# Patient Record
Sex: FEMALE | Race: BLACK OR AFRICAN AMERICAN | Employment: UNEMPLOYED | URBAN - METROPOLITAN AREA
[De-identification: names, ages, dates, MRNs, and addresses within clinical notes are randomized per-mention and may not be internally consistent; named-entity substitution may affect disease eponyms.]

---

## 2021-04-22 ENCOUNTER — HOSPITAL ENCOUNTER (EMERGENCY)
Age: 26
Discharge: HOME | End: 2021-04-22
Payer: MEDICAID

## 2021-04-22 VITALS
RESPIRATION RATE: 18 BRPM | OXYGEN SATURATION: 100 % | HEART RATE: 80 BPM | DIASTOLIC BLOOD PRESSURE: 56 MMHG | WEIGHT: 288.14 LBS | SYSTOLIC BLOOD PRESSURE: 104 MMHG | BODY MASS INDEX: 47.95 KG/M2 | TEMPERATURE: 99 F

## 2021-04-22 DIAGNOSIS — R10.9 ABDOMINAL CRAMPING: Primary | ICD-10-CM

## 2021-04-22 LAB
BACTERIA: ABNORMAL /HPF
BILIRUBIN URINE: NEGATIVE
BLOOD, URINE: NEGATIVE
CLARITY: ABNORMAL
COLOR: YELLOW
EPITHELIAL CELLS, UA: 8 /HPF (ref 0–5)
GLUCOSE URINE: NEGATIVE MG/DL
HCG(URINE) PREGNANCY TEST: POSITIVE
HYALINE CASTS: 1 /LPF (ref 0–8)
KETONES, URINE: ABNORMAL MG/DL
LEUKOCYTE ESTERASE, URINE: NEGATIVE
MICROSCOPIC EXAMINATION: YES
NITRITE, URINE: NEGATIVE
PH UA: 7.5 (ref 5–8)
PROTEIN UA: NEGATIVE MG/DL
RBC UA: 1 /HPF (ref 0–4)
SPECIFIC GRAVITY UA: 1.01 (ref 1–1.03)
URINE REFLEX TO CULTURE: ABNORMAL
URINE TYPE: ABNORMAL
UROBILINOGEN, URINE: 0.2 E.U./DL
WBC UA: 4 /HPF (ref 0–5)

## 2021-04-22 PROCEDURE — 84703 CHORIONIC GONADOTROPIN ASSAY: CPT

## 2021-04-22 PROCEDURE — 99284 EMERGENCY DEPT VISIT MOD MDM: CPT

## 2021-04-22 PROCEDURE — 81001 URINALYSIS AUTO W/SCOPE: CPT

## 2021-04-22 ASSESSMENT — ENCOUNTER SYMPTOMS
BACK PAIN: 0
EYE DISCHARGE: 0
APNEA: 0
FACIAL SWELLING: 0
ABDOMINAL PAIN: 1
SHORTNESS OF BREATH: 0
CHOKING: 0
EYE REDNESS: 0
SORE THROAT: 0
VOMITING: 0
NAUSEA: 0

## 2021-04-22 ASSESSMENT — PAIN DESCRIPTION - FREQUENCY: FREQUENCY: CONTINUOUS

## 2021-04-22 ASSESSMENT — PAIN DESCRIPTION - ORIENTATION
ORIENTATION: LOWER
ORIENTATION: LOWER

## 2021-04-22 ASSESSMENT — PAIN SCALES - GENERAL: PAINLEVEL_OUTOF10: 2

## 2021-04-22 ASSESSMENT — PAIN DESCRIPTION - LOCATION: LOCATION: ABDOMEN

## 2021-04-22 NOTE — ED TRIAGE NOTES
Pt arrived to dept via private vehicle. Pt c/o lower abd cramping x 3 days with no bleeding or vaginal discharge. Pt reports being 15 weeks pregnant. Pt awake, alert and oriented x 3. Skin warm and dry/normal color for ethnicity. Resp easy and unlabored. Pt placed in gown and on cardiac monitor. Call light in reach. Will continue to monitor.

## 2021-04-22 NOTE — ED PROVIDER NOTES
**ADVANCED PRACTICE PROVIDER, I HAVE EVALUATED THIS PATIENT**        [address]      Pt Name: Radha Wasserman  IDX:[id number]  [place] 1995  Date of evaluation: 4/22/2021  Provider: Sacha Woods PA-C      Chief Complaint:    Chief Complaint   Patient presents with    Urinary Tract Infection     Patient states she started having lower abdominal cramping x3 days. Believes its UTI. Pt is 15 weeks 4 days pregnant       Nursing Notes, Past Medical Hx, Past Surgical Hx, Social Hx, Allergies, and Family Hx were all reviewed and agreed with or any disagreements were addressed in the HPI.    HPI:  (Location, Duration, Timing, Severity, Quality, Assoc Sx, Context, Modifying factors)  This is a  30 y.o. female who is 15 weeks and 4 days pregnant complaining of some lower abdominal cramping. She denies any vaginal bleeding or vaginal pain. Denies fever. No upper abdominal pain, no nausea vomiting. She talked to her Dula who told her to come and get a urinalysis and get checked out. Patient has no other complaint. Had an ultrasound which shows IUP. He states she had that at 7 weeks      PastMedical/Surgical History:      Diagnosis Date    Anemia     Migraine 10/5/2012    Morbid obesity ([facility] [address]      History reviewed. No pertinent surgical history. Medications:  Previous Medications    BENZOYL PEROXIDE 5 % GEL    Apply topically to affected areas nightly as directed. FERROUS SULFATE (FE TABS) 325 (65 FE) MG EC TABLET    Take 1 tablet by mouth 3 times daily (with meals). FERROUS SULFATE 325 (65 FE) MG TABLET    TAKE 1 TABLET BY MOUTH TWICE DAILY    LEVONORGESTREL-ETHINYL ESTRADIOL (AVIANE;ALESSE;LESSINA) 0.1-20 MG-MCG PER TABLET    Take 1 tablet by mouth daily for 360 days. LEVONORGESTREL-ETHINYL ESTRADIOL (LUTERA) 0.1-20 MG-MCG PER TABLET    Take 1 tablet by mouth daily.     SUMATRIPTAN (IMITREX) 20 MG/ACT NASAL SPRAY    1 spray by Nasal route daily as needed for Migraine. Review of Systems:  Review of Systems   Constitutional: Negative for chills and fever. HENT: Negative for congestion, facial swelling and sore throat. Eyes: Negative for discharge and redness. Respiratory: Negative for apnea, choking and shortness of breath. Cardiovascular: Negative for chest pain. Gastrointestinal: Positive for abdominal pain. Negative for nausea and vomiting. Genitourinary: Negative for dysuria. Musculoskeletal: Negative for back pain, neck pain and neck stiffness. Neurological: Negative for dizziness, tremors, seizures, weakness and headaches. All other systems reviewed and are negative. Positives and Pertinent negatives as per HPI. Except as noted above in the ROS, problem specific ROS was completed and is negative. Physical Exam:  Physical Exam  Vitals signs and nursing note reviewed. Constitutional:       Appearance: She is well-developed. She is not diaphoretic. HENT:      Head: Normocephalic and atraumatic. Nose: Nose normal.   Eyes:      General:         Right eye: No discharge. Left eye: No discharge. Neck:      Musculoskeletal: Normal range of motion and neck supple. Cardiovascular:      Rate and Rhythm: Normal rate and regular rhythm. Heart sounds: Normal heart sounds. No murmur. No friction rub. No gallop. Pulmonary:      Effort: Pulmonary effort is normal. No respiratory distress. Breath sounds: Normal breath sounds. No wheezing or rales. Chest:      Chest wall: No tenderness. Abdominal:      General: Abdomen is flat. Bowel sounds are normal. There is no distension. Palpations: Abdomen is soft. There is no mass. Tenderness: There is no abdominal tenderness. There is no guarding or rebound. Musculoskeletal: Normal range of motion. Skin:     General: Skin is warm and dry. Neurological:      General: No focal deficit present.       Mental Status: She is alert and oriented to person, place, and time. Psychiatric:         Behavior: Behavior normal.         MEDICAL DECISION MAKING    Vitals:    Vitals:    04/22/21 0955 04/22/21 1045 04/22/21 1235   BP: 109/69 (!) 98/57 (!) 104/56   Pulse: 101 79 80   Resp: 18 18 18   Temp: 99 °F (37.2 °C)     TempSrc: Oral     SpO2: 100% 100% 100%   Weight: 288 lb 2.3 oz (130.7 kg)         LABS:  Labs Reviewed   URINE RT REFLEX TO CULTURE - Abnormal; Notable for the following components:       Result Value    Clarity, UA CLOUDY (*)     Ketones, Urine TRACE (*)     All other components within normal limits    Narrative:     Performed at:  [address] [employer]   Phone [phone]   MICROSCOPIC URINALYSIS - Abnormal; Notable for the following components:    Bacteria, UA 1+ (*)     Epithelial Cells, UA 8 (*)     All other components within normal limits    Narrative:     Performed at:  [address] [place] [address]   Phone [phone]   PREGNANCY, URINE    Narrative:     Performed at:  [address] [place] [address]   Phone ([phone] of labs reviewed and werenegative at this time or not returned at the time of this note. RADIOLOGY:   Non-plain film images such as CT, Ultrasound and MRI are read by the radiologist. Amena Sotomayor PA-C have directly visualized the radiologic plain film image(s) with the below findings:        Interpretation per the Radiologist below, if available at the time of this note:    No orders to display        No results found. MEDICAL DECISION MAKING / ED COURSE:      PROCEDURES:   Procedures    None    Patient was given:  Medications - No data to display    Emergency room course: Patient on exam cardiovascular regular rhythm, lungs are clear. No wheeze rales or rhonchi noted.   No chest wall tenderness with palpation. Abdomen is soft nontender. No rebound or guarding noted. No CVA or flank tenderness. Patient has full range of motion of all extremity. Alert oriented x4. Does not appear to be in acute distress. Urinalysis shows negative leukocytes, negative for nitrites, negative for blood, trace ketones. Positive for pregnancy. Microscopically bacteria is 1+, hyaline casts 1, WBC of 4, RBC of 1, epithelial cells of 8. Discussed patient urine results with her. Informed to take Tylenol for any cramping pain. Follow back up with her OB/GYN doctor. Return for any worsening this includes fever, increased pain any vaginal bleeding or any persistent nausea vomiting. She understood discharge plan she will be discharged stable condition. .      The patient tolerated their visit well. I evaluated the patient. The physician was available for consultation as needed. The patient and / or the family were informed of the results of any tests, a time was given to answer questions, a plan was proposed and they agreed with plan. CLINICAL IMPRESSION:  1.  Abdominal cramping        DISPOSITION Decision To Discharge 04/22/2021 12:39:44 PM      PATIENT REFERRED TO:  Misti Cartwright MD  [place] [id number]  [address]  [phone]    Call   As needed      DISCHARGE MEDICATIONS:  New Prescriptions    No medications on file       DISCONTINUED MEDICATIONS:  Discontinued Medications    No medications on file              (Please note the MDM and HPI sections of this note were completed with a voice recognition program.  Efforts were made to edit the dictations but occasionally words are mis-transcribed.)    Electronically signed, Sony Conrad PA-C,          Sony Conrad PA-C  04/22/21 [phone]

## 2021-04-22 NOTE — ED NOTES
Discharge and education instructions reviewed. Patient verbalized understanding, teach-back successful. Patient denied questions at this time. No acute distress noted. Patient instructed to follow-up as noted - return to emergency department if symptoms worsen. Patient verbalized understanding. Discharged per EDMD with discharge instructions.           Claudette Rover, RN  04/22/21 2597